# Patient Record
Sex: FEMALE | Race: WHITE | Employment: FULL TIME | URBAN - METROPOLITAN AREA
[De-identification: names, ages, dates, MRNs, and addresses within clinical notes are randomized per-mention and may not be internally consistent; named-entity substitution may affect disease eponyms.]

---

## 2019-06-27 ENCOUNTER — APPOINTMENT (OUTPATIENT)
Dept: GENERAL RADIOLOGY | Age: 34
End: 2019-06-27
Payer: COMMERCIAL

## 2019-06-27 ENCOUNTER — HOSPITAL ENCOUNTER (EMERGENCY)
Age: 34
Discharge: HOME | End: 2019-06-27
Payer: COMMERCIAL

## 2019-06-27 VITALS
WEIGHT: 162.7 LBS | SYSTOLIC BLOOD PRESSURE: 137 MMHG | TEMPERATURE: 97.6 F | HEIGHT: 65 IN | HEART RATE: 79 BPM | RESPIRATION RATE: 16 BRPM | OXYGEN SATURATION: 98 % | BODY MASS INDEX: 27.11 KG/M2 | DIASTOLIC BLOOD PRESSURE: 86 MMHG

## 2019-06-27 DIAGNOSIS — S61.211A LACERATION OF LEFT INDEX FINGER WITHOUT FOREIGN BODY WITHOUT DAMAGE TO NAIL, INITIAL ENCOUNTER: Primary | ICD-10-CM

## 2019-06-27 DIAGNOSIS — T14.8XXA SUTURED SKIN WOUND: ICD-10-CM

## 2019-06-27 PROCEDURE — 6370000000 HC RX 637 (ALT 250 FOR IP): Performed by: NURSE PRACTITIONER

## 2019-06-27 PROCEDURE — 99283 EMERGENCY DEPT VISIT LOW MDM: CPT

## 2019-06-27 PROCEDURE — 73140 X-RAY EXAM OF FINGER(S): CPT

## 2019-06-27 PROCEDURE — 4500000023 HC ED LEVEL 3 PROCEDURE

## 2019-06-27 RX ORDER — BUPIVACAINE HYDROCHLORIDE 5 MG/ML
30 INJECTION, SOLUTION EPIDURAL; INTRACAUDAL ONCE
Status: DISCONTINUED | OUTPATIENT
Start: 2019-06-27 | End: 2019-06-27 | Stop reason: HOSPADM

## 2019-06-27 RX ORDER — BUPROPION HYDROCHLORIDE 150 MG/1
150 TABLET ORAL
COMMUNITY

## 2019-06-27 RX ORDER — ORPHENADRINE CITRATE 100 MG/1
100 TABLET, EXTENDED RELEASE ORAL
COMMUNITY
Start: 2019-06-09

## 2019-06-27 RX ORDER — LIDOCAINE HYDROCHLORIDE 10 MG/ML
5 INJECTION, SOLUTION INFILTRATION; PERINEURAL ONCE
Status: DISCONTINUED | OUTPATIENT
Start: 2019-06-27 | End: 2019-06-27 | Stop reason: HOSPADM

## 2019-06-27 RX ORDER — NAPROXEN 500 MG/1
500 TABLET ORAL 2 TIMES DAILY
Qty: 60 TABLET | Refills: 0 | Status: SHIPPED | OUTPATIENT
Start: 2019-06-27

## 2019-06-27 RX ORDER — ACETAMINOPHEN 500 MG
1000 TABLET ORAL ONCE
Status: COMPLETED | OUTPATIENT
Start: 2019-06-27 | End: 2019-06-27

## 2019-06-27 RX ADMIN — ACETAMINOPHEN 1000 MG: 500 TABLET ORAL at 20:37

## 2019-06-27 ASSESSMENT — PAIN SCALES - GENERAL
PAINLEVEL_OUTOF10: 4
PAINLEVEL_OUTOF10: 4
PAINLEVEL_OUTOF10: 10

## 2019-06-27 ASSESSMENT — PAIN DESCRIPTION - DESCRIPTORS
DESCRIPTORS: THROBBING
DESCRIPTORS: THROBBING

## 2019-06-27 ASSESSMENT — PAIN DESCRIPTION - LOCATION
LOCATION: FINGER (COMMENT WHICH ONE)
LOCATION: OTHER (COMMENT)

## 2019-06-27 ASSESSMENT — PAIN DESCRIPTION - FREQUENCY
FREQUENCY: CONTINUOUS
FREQUENCY: CONTINUOUS

## 2019-06-27 ASSESSMENT — PAIN DESCRIPTION - PROGRESSION: CLINICAL_PROGRESSION: GRADUALLY IMPROVING

## 2019-06-27 ASSESSMENT — PAIN DESCRIPTION - ORIENTATION
ORIENTATION: LEFT
ORIENTATION: LEFT

## 2019-06-27 ASSESSMENT — PAIN DESCRIPTION - PAIN TYPE
TYPE: ACUTE PAIN
TYPE: ACUTE PAIN

## 2019-06-27 ASSESSMENT — PAIN - FUNCTIONAL ASSESSMENT
PAIN_FUNCTIONAL_ASSESSMENT: 0-10
PAIN_FUNCTIONAL_ASSESSMENT: PREVENTS OR INTERFERES SOME ACTIVE ACTIVITIES AND ADLS

## 2019-06-27 NOTE — ED NOTES
Assumed care of pt at this time. Pt c/o laceration to left 2nd finger with  approx. 1 pm today. States tried to use super glue to fix it, and bleeding did stop, but laceration re-opened. Pt a & o x 3. Skin w/d, color pink. resp easy. Noted approx. 1 cm laceration to left 2nd digit with no active bleeding noted. Brisk cap refill. Sensation intact. Full rom observed. No deformity noted. Pt villavicencio (=) without deficit. Ambulatory with steady gait. Visitor at . Call light in reach. Nad.       Vincenzo Pacheco RN  06/27/19 1949

## 2019-06-27 NOTE — ED PROVIDER NOTES
[address]  [address] [place] [id number]  Dept: [phone]  Loc: [phone]    EMERGENCY DEPARTMENT ENCOUNTER        This patient was not seen or evaluated by the attending physician. I evaluated this patient, the attending physician was available for consultation. CHIEF COMPLAINT    Chief Complaint   Patient presents with    Laceration     left index finger laceration from         HPI    Claudio Almonte is a 30 y.o. female who presents with left second digit finger laceration and pain. The onset was earlier today. The duration has been constant since the onset. The quality of the pain is throbbing and the severity is 10/10. The patient has no other associated injury. 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 She states that she is up-to-date on her tetanus, will last received in December 2018. She originally attempted to close the wound herself with superglue however has since washed it off. She states that bleeding was controlled at home. Came to the ED for further evaluation and treatment at the request of her mother. REVIEW OF SYSTEMS    Skin: + laceration, no puncture wounds  Musculoskeletal: see HPI, no other joint or bony injury or pain  Neurologic: No loss of consciousness, no head injury, no paresthesias or focal distal extremity weakness  Immunization: Tetanus status known, will not need to be updated in the ED  All other systems reviewed and are negative. PAST MEDICAL & SURGICAL HISTORY    History reviewed. No pertinent past medical history. History reviewed. No pertinent surgical history.     CURRENT MEDICATIONS  (may include discharge medications prescribed in the ED)      ALLERGIES    Allergies   Allergen Reactions    Codeine        SOCIAL & FAMILY HISTORY    Social History Socioeconomic History    Marital status: Single     Spouse name: None    Number of children: None    Years of education: None    Highest education level: None   Occupational History    None   Social Needs    Financial resource strain: None    Food insecurity:     Worry: None     Inability: None    Transportation needs:     Medical: None     Non-medical: None   Tobacco Use    Smoking status: Never Smoker    Smokeless tobacco: Never Used   Substance and Sexual Activity    Alcohol use: Not Currently     Comment: socially    Drug use: Never    Sexual activity: None   Lifestyle    Physical activity:     Days per week: None     Minutes per session: None    Stress: None   Relationships    Social connections:     Talks on phone: None     Gets together: None     Attends Islam service: None     Active member of club or organization: None     Attends meetings of clubs or organizations: None     Relationship status: None    Intimate partner violence:     Fear of current or ex partner: None     Emotionally abused: None     Physically abused: None     Forced sexual activity: None   Other Topics Concern    None   Social History Narrative    None     History reviewed. No pertinent family history.       PHYSICAL EXAM    VITAL SIGNS: /86   Pulse 79   Temp 97.6 °F (36.4 °C) (Oral)   Resp 16   Ht 5' 5\" (1.651 m)   Wt 162 lb 11.2 oz (73.8 kg)   LMP 06/19/2019   SpO2 98%   BMI 27.07 kg/m²   Constitutional:  Well nourished, no acute distress  HENT:  Atraumatic, moist mucous membranes  NECK: normal range of motion,  supple   Respiratory:  No respiratory distress  Cardiovascular:  No JVD  Vascular: left radial pulse 2+, capillary refill less than 2 seconds  Musculoskeletal: Patient is able to flex and extend the left second digit PIP and DIP joints against resistance, no edema, no deformity  Integument:  Well hydrated, + 0.5 cm skin lacerations horizontal laceration distal to the PIP joint of the second digit on the left hand  Neurologic:  Awake alert, no slurred speech, sensory and motor intact in the affected limb    RADIOLOGY   XR FINGER LEFT (MIN 2 VIEWS)   Final Result   No acute osseous abnormality. No radiopaque foreign body. Digital Block Procedure Note  Indication: Finger lack repair  Procedure: The patient was positioned appropriately and the skin of the affected digit was cleansed with chlorhexidine. Approximately 5 mL of 0.5% bupivacaine without epinephrine was infiltrated around the base of the digit at the appropriate landmarks. The patient tolerated the procedure well. Complications: none    Laceration Repair Procedure Note  Performed by: myself  Consent:  Verbal consent obtained by patient. Risk of infection and pain discussed, as well as alternatives. Anesthesia:  The patient was placed in the appropriate position and anesthesia obtained by infiltration using 1% Lidocaine without epinephrine. Repair type:  simple     Pre-procedure:  Patient was prepped and draped in usual sterile fashion   Laceration details:    Location: Left second digit  Length (cm):  0.5  Preparation:  Patient was prepped and draped in usual sterile fashion   Exploration: Hemostasis achieved with direct pressure, entire depth of wound probed and visualized    Contaminated: no    Treatment:   Amount of cleaning:  Extensive     Irrigation solution:  High pressure sterile water  Visualized foreign bodies/material removed: no    Skin repair:   Repair method:  Sutures  Suture size:  4-0  Suture material:  Nylon  Suture technique:  Simple interrupted  Approximation:  Close  Number of sutures: 2  Dressing:  Sterile dressing and antibiotic ointment  Patient tolerance of procedure: Tolerated well, no immediate complications    PROCEDURES  SPLINT PLACEMENT  A AlumaFoam finger splint was applied to the affected limb by the emergency department technician. I evaluated the splint after it was applied.   The splint was in good position. The patient's extremity was neurovascularly intact after the splint placement. ED COURSE & MEDICAL DECISION MAKING   See chart for medications given during emergency department course. Differential diagnosis: includes but not limited to Arterial Injury/Ischemia, Fracture, Dislocation, Infection, Compartment Syndrome, Neurologic Deficit/Injury. No evidence of neurovascular injury on exam.  Plain films as above. The patient was instructed to follow up as an outpatient in 2-3 days with primary care provider for wound check and to return to the ED or to the primary care provider in the next 7 days for suture removal. The patient was instructed to return to the ED immediately for any new or worsening symptoms. The patient verbalized understanding. FINAL IMPRESSION    1. Laceration of left index finger without foreign body without damage to nail, initial encounter    2.  Sutured skin wound        PLAN  Discharge with outpatient follow-up and discharge instructions (see EMR)    (Please note that this note was completed with a voice recognition program.  Every attempt was made to edit the dictations, but inevitably there remain words that are mis-transcribed.)        KEYONNA Gonzalez - CNP  06/28/19 0007

## 2019-06-28 NOTE — ED NOTES
bandaid and finger splint applied per order. D/C instructions, including RX and follow up care given to pt. Pt verbalized understanding and denies further questions or needs at this time. Ambulatory to waiting room with steady gait. NAD.         Terrance Whitten RN  06/27/19 2045

## 2020-02-01 ENCOUNTER — HOSPITAL ENCOUNTER (EMERGENCY)
Age: 35
Discharge: HOME | End: 2020-02-01
Payer: MEDICAID

## 2020-02-01 VITALS
RESPIRATION RATE: 14 BRPM | WEIGHT: 169.97 LBS | TEMPERATURE: 98.7 F | HEART RATE: 87 BPM | BODY MASS INDEX: 28.32 KG/M2 | SYSTOLIC BLOOD PRESSURE: 117 MMHG | HEIGHT: 65 IN | DIASTOLIC BLOOD PRESSURE: 80 MMHG | OXYGEN SATURATION: 98 %

## 2020-02-01 LAB
RAPID INFLUENZA  B AGN: NEGATIVE
RAPID INFLUENZA A AGN: NEGATIVE
S PYO AG THROAT QL: NEGATIVE

## 2020-02-01 PROCEDURE — 87804 INFLUENZA ASSAY W/OPTIC: CPT

## 2020-02-01 PROCEDURE — 87880 STREP A ASSAY W/OPTIC: CPT

## 2020-02-01 PROCEDURE — 87081 CULTURE SCREEN ONLY: CPT

## 2020-02-01 PROCEDURE — 99283 EMERGENCY DEPT VISIT LOW MDM: CPT

## 2020-02-01 RX ORDER — BENZONATATE 100 MG/1
100 CAPSULE ORAL 3 TIMES DAILY PRN
Qty: 20 CAPSULE | Refills: 0 | Status: SHIPPED | OUTPATIENT
Start: 2020-02-01 | End: 2020-02-08

## 2020-02-01 RX ORDER — OSELTAMIVIR PHOSPHATE 75 MG/1
75 CAPSULE ORAL 2 TIMES DAILY
Qty: 10 CAPSULE | Refills: 0 | Status: SHIPPED | OUTPATIENT
Start: 2020-02-01 | End: 2020-02-06

## 2020-02-01 ASSESSMENT — PAIN DESCRIPTION - DESCRIPTORS: DESCRIPTORS: ACHING

## 2020-02-01 ASSESSMENT — PAIN DESCRIPTION - FREQUENCY: FREQUENCY: CONTINUOUS

## 2020-02-01 ASSESSMENT — PAIN DESCRIPTION - PAIN TYPE: TYPE: ACUTE PAIN

## 2020-02-01 ASSESSMENT — ENCOUNTER SYMPTOMS
SHORTNESS OF BREATH: 0
NAUSEA: 0
SORE THROAT: 1
RHINORRHEA: 1
CHEST TIGHTNESS: 0
VOMITING: 0
COUGH: 1

## 2020-02-01 ASSESSMENT — PAIN SCALES - GENERAL
PAINLEVEL_OUTOF10: 9
PAINLEVEL_OUTOF10: 9

## 2020-02-01 ASSESSMENT — PAIN DESCRIPTION - LOCATION: LOCATION: GENERALIZED

## 2020-02-01 NOTE — ED NOTES
Pt presents to ED via private vehicle for c/o cough, headache, sore throat, and generalized body aches. Did not receive flu vaccine. Does report sick contact.       Donnie Childs RN  02/01/20 5790

## 2020-02-01 NOTE — ED PROVIDER NOTES
reviewed. No pertinent past medical history. SURGICAL HISTORY     History reviewed. No pertinent surgical history. CURRENT MEDICATIONS     [unfilled]    ALLERGIES     Codeine    FAMILY HISTORY     History reviewed. No pertinent family history. No family status information on file. SOCIAL HISTORY      reports that she has never smoked. She has never used smokeless tobacco. She reports previous alcohol use. She reports that she does not use drugs. PHYSICAL EXAM    (up to 7 for level 4, 8 or more for level 5)     ED Triage Vitals [02/01/20 1546]   Enc Vitals Group      /80      Pulse 101      Resp 14      Temp 98.7 °F (37.1 °C)      Temp Source Oral      SpO2 98 %      Weight 169 lb 15.6 oz (77.1 kg)      Height 5' 5\" (1.651 m)      Head Circumference       Peak Flow       Pain Score       Pain Loc       Pain Edu? Excl. in [address]? Physical Exam  Vitals signs reviewed. Constitutional:       Appearance: Normal appearance. HENT:      Head: Normocephalic and atraumatic. Nose: Nose normal.      Mouth/Throat:      Pharynx: No posterior oropharyngeal erythema. Neck:      Musculoskeletal: Normal range of motion and neck supple. Cardiovascular:      Rate and Rhythm: Normal rate and regular rhythm. Pulmonary:      Effort: Pulmonary effort is normal. No respiratory distress. Breath sounds: Normal breath sounds. No stridor. No wheezing, rhonchi or rales. Musculoskeletal: Normal range of motion. Skin:     General: Skin is warm. Neurological:      General: No focal deficit present. Mental Status: She is alert and oriented to person, place, and time.    Psychiatric:         Mood and Affect: Mood normal.         Behavior: Behavior normal.           DIAGNOSTIC RESULTS     EKG: All EKG's are interpreted by the Emergency Department Physician who either signs or Co-signs this chart in the absence of a cardiologist.    RADIOLOGY:   Non-plain film images such as CT, patient. Patient comfortable plan. Discharged home in stable condition. The patient tolerated their visit well. I have discussed the findings of today's workup with the patient and addressed the patient's questions and concerns. Important warning signs as well as new or worsening symptoms which would necessitate immediate return to the ED were discussed. The plan is to discharge from the ED at this time, and the patient is in stable condition. The patient acknowledged understanding is agreeable with this plan. CONSULTS:  None    PROCEDURES:  Procedures    FINAL IMPRESSION      1. Influenza          DISPOSITION/PLAN   [unfilled]    PATIENT REFERRED TO:  [facility] Emergency Department  [address] [id number]  [phone]  Go to   If symptoms worsen    Florencia Bal    Schedule an appointment as soon as possible for a visit in 1 week  For follow up and reevaluation.       DISCHARGE MEDICATIONS:  New Prescriptions    BENZONATATE (TESSALON PERLES) 100 MG CAPSULE    Take 1 capsule by mouth 3 times daily as needed for Cough    OSELTAMIVIR (TAMIFLU) 75 MG CAPSULE    Take 1 capsule by mouth 2 times daily for 5 days       (Please note that portions of this note were completed with a voice recognition program.  Efforts were made to edit the dictations but occasionally words are mis-transcribed.)    RAISA Fernandes PA-C  02/01/20 [address]  02/01/20 1740

## 2020-02-01 NOTE — LETTER
[facility] Emergency Department  [address] [facility] [address]  Phone: [phone]               February 1, 2020    Patient: Regi Guerrero   YOB: 1985   Date of Visit: 2/1/2020       To Whom It May Concern:    Regi Guerrero was seen and treated in our emergency department on 2/1/2020. She may return to work on 2/8/2020 or sooner if resolution of symptoms.       Sincerely,       [address] [facility] HospitalRAISA         Signature:__________________________________

## 2020-02-03 LAB — S PYO THROAT QL CULT: NORMAL
